# Patient Record
Sex: MALE | Race: BLACK OR AFRICAN AMERICAN | NOT HISPANIC OR LATINO
[De-identification: names, ages, dates, MRNs, and addresses within clinical notes are randomized per-mention and may not be internally consistent; named-entity substitution may affect disease eponyms.]

---

## 2022-01-24 ENCOUNTER — APPOINTMENT (OUTPATIENT)
Dept: FAMILY MEDICINE | Facility: CLINIC | Age: 22
End: 2022-01-24
Payer: COMMERCIAL

## 2022-01-24 VITALS
DIASTOLIC BLOOD PRESSURE: 80 MMHG | OXYGEN SATURATION: 98 % | SYSTOLIC BLOOD PRESSURE: 110 MMHG | WEIGHT: 214 LBS | TEMPERATURE: 97.7 F | HEIGHT: 69 IN | BODY MASS INDEX: 31.7 KG/M2 | HEART RATE: 72 BPM

## 2022-01-24 DIAGNOSIS — Z83.3 FAMILY HISTORY OF DIABETES MELLITUS: ICD-10-CM

## 2022-01-24 DIAGNOSIS — Z13.220 ENCOUNTER FOR SCREENING FOR LIPOID DISORDERS: ICD-10-CM

## 2022-01-24 DIAGNOSIS — Z13.29 ENCOUNTER FOR SCREENING FOR OTHER SUSPECTED ENDOCRINE DISORDER: ICD-10-CM

## 2022-01-24 DIAGNOSIS — Z13.1 ENCOUNTER FOR SCREENING FOR DIABETES MELLITUS: ICD-10-CM

## 2022-01-24 DIAGNOSIS — Z23 ENCOUNTER FOR IMMUNIZATION: ICD-10-CM

## 2022-01-24 DIAGNOSIS — Z13.21 ENCOUNTER FOR SCREENING FOR NUTRITIONAL DISORDER: ICD-10-CM

## 2022-01-24 DIAGNOSIS — Z00.00 ENCOUNTER FOR GENERAL ADULT MEDICAL EXAMINATION W/OUT ABNORMAL FINDINGS: ICD-10-CM

## 2022-01-24 PROCEDURE — 36415 COLL VENOUS BLD VENIPUNCTURE: CPT

## 2022-01-24 PROCEDURE — 99385 PREV VISIT NEW AGE 18-39: CPT | Mod: 25

## 2022-01-24 PROCEDURE — G0444 DEPRESSION SCREEN ANNUAL: CPT | Mod: 59

## 2022-01-24 PROCEDURE — G0442 ANNUAL ALCOHOL SCREEN 15 MIN: CPT

## 2022-01-24 NOTE — ASSESSMENT
[FreeTextEntry1] : Annual physical: f/u routine labwork\par BMI 31: Recommend low fat diet, wt loss, exercise, nutritional counseling provided, f/u lipid panel\par RTC 3 wks

## 2022-01-24 NOTE — HEALTH RISK ASSESSMENT
[Very Good] : ~his/her~  mood as very good [Never] : Never [1 or 2 (0 pts)] : 1 or 2 (0 points) [Never (0 pts)] : Never (0 points) [No] : In the past 12 months have you used drugs other than those required for medical reasons? No [No falls in past year] : Patient reported no falls in the past year [0] : 2) Feeling down, depressed, or hopeless: Not at all (0) [PHQ-2 Negative - No further assessment needed] : PHQ-2 Negative - No further assessment needed [Audit-CScore] : 0 [de-identified] : needs improvement [de-identified] : needs improvement [SJS3Othhn] : 0 [HIV test declined] : HIV test declined [Hepatitis C test declined] : Hepatitis C test declined [With Family] : lives with family [Employed] : employed [Student] : student [Single] : single [Sexually Active] : not sexually active [High Risk Behavior] : no high risk behavior [Feels Safe at Home] : Feels safe at home [Fully functional (bathing, dressing, toileting, transferring, walking, feeding)] : Fully functional (bathing, dressing, toileting, transferring, walking, feeding) [Fully functional (using the telephone, shopping, preparing meals, housekeeping, doing laundry, using] : Fully functional and needs no help or supervision to perform IADLs (using the telephone, shopping, preparing meals, housekeeping, doing laundry, using transportation, managing medications and managing finances) [Reports changes in hearing] : Reports no changes in hearing [Reports changes in vision] : Reports no changes in vision [Reports changes in dental health] : Reports no changes in dental health

## 2022-01-24 NOTE — HISTORY OF PRESENT ILLNESS
[FreeTextEntry1] : NP-CPE [de-identified] : 22 yo male presents for annual physical. No acute complaints. Reports feeling well. Denies fever, chills, cp, palpitations, sob, nv, heat/cold intolerance, dizziness, melena, hematochezia, muscle weakness, loss of sensation, bowel/bladder incontinence or calf pain.\par

## 2022-01-30 LAB
25(OH)D3 SERPL-MCNC: 10 NG/ML
ALBUMIN SERPL ELPH-MCNC: 4.6 G/DL
ALP BLD-CCNC: 69 U/L
ALT SERPL-CCNC: 29 U/L
ANION GAP SERPL CALC-SCNC: 12 MMOL/L
APPEARANCE: CLEAR
AST SERPL-CCNC: 23 U/L
BACTERIA: NEGATIVE
BASOPHILS # BLD AUTO: 0.01 K/UL
BASOPHILS NFR BLD AUTO: 0.2 %
BILIRUB SERPL-MCNC: 0.4 MG/DL
BILIRUBIN URINE: NEGATIVE
BLOOD URINE: NEGATIVE
BUN SERPL-MCNC: 8 MG/DL
CALCIUM SERPL-MCNC: 9.4 MG/DL
CHLORIDE SERPL-SCNC: 99 MMOL/L
CHOLEST SERPL-MCNC: 121 MG/DL
CO2 SERPL-SCNC: 26 MMOL/L
COLOR: YELLOW
CREAT SERPL-MCNC: 0.74 MG/DL
EOSINOPHIL # BLD AUTO: 0.08 K/UL
EOSINOPHIL NFR BLD AUTO: 1.8 %
ESTIMATED AVERAGE GLUCOSE: 120 MG/DL
GLUCOSE QUALITATIVE U: NEGATIVE
GLUCOSE SERPL-MCNC: 84 MG/DL
HBA1C MFR BLD HPLC: 5.8 %
HCT VFR BLD CALC: 43.7 %
HDLC SERPL-MCNC: 36 MG/DL
HGB BLD-MCNC: 13.8 G/DL
HYALINE CASTS: 0 /LPF
IMM GRANULOCYTES NFR BLD AUTO: 0.5 %
KETONES URINE: NEGATIVE
LDLC SERPL CALC-MCNC: 69 MG/DL
LEUKOCYTE ESTERASE URINE: NEGATIVE
LYMPHOCYTES # BLD AUTO: 1.8 K/UL
LYMPHOCYTES NFR BLD AUTO: 40.5 %
MAN DIFF?: NORMAL
MCHC RBC-ENTMCNC: 22.3 PG
MCHC RBC-ENTMCNC: 31.6 GM/DL
MCV RBC AUTO: 70.6 FL
MICROSCOPIC-UA: NORMAL
MONOCYTES # BLD AUTO: 0.62 K/UL
MONOCYTES NFR BLD AUTO: 14 %
NEUTROPHILS # BLD AUTO: 1.91 K/UL
NEUTROPHILS NFR BLD AUTO: 43 %
NITRITE URINE: NEGATIVE
NONHDLC SERPL-MCNC: 85 MG/DL
PH URINE: 7.5
PLATELET # BLD AUTO: 321 K/UL
POTASSIUM SERPL-SCNC: 4.3 MMOL/L
PROT SERPL-MCNC: 7.4 G/DL
PROTEIN URINE: NORMAL
RBC # BLD: 6.19 M/UL
RBC # FLD: 14.7 %
RED BLOOD CELLS URINE: 2 /HPF
SODIUM SERPL-SCNC: 136 MMOL/L
SPECIFIC GRAVITY URINE: 1.03
SQUAMOUS EPITHELIAL CELLS: 0 /HPF
TRIGL SERPL-MCNC: 78 MG/DL
TSH SERPL-ACNC: 2.81 UIU/ML
UROBILINOGEN URINE: NORMAL
WBC # FLD AUTO: 4.44 K/UL
WHITE BLOOD CELLS URINE: 1 /HPF

## 2022-02-07 ENCOUNTER — APPOINTMENT (OUTPATIENT)
Dept: FAMILY MEDICINE | Facility: CLINIC | Age: 22
End: 2022-02-07
Payer: COMMERCIAL

## 2022-02-07 VITALS
TEMPERATURE: 96.9 F | DIASTOLIC BLOOD PRESSURE: 78 MMHG | SYSTOLIC BLOOD PRESSURE: 130 MMHG | WEIGHT: 214 LBS | OXYGEN SATURATION: 98 % | HEIGHT: 66 IN | BODY MASS INDEX: 34.39 KG/M2 | HEART RATE: 78 BPM

## 2022-02-07 VITALS — SYSTOLIC BLOOD PRESSURE: 126 MMHG | DIASTOLIC BLOOD PRESSURE: 78 MMHG

## 2022-02-07 PROCEDURE — 90686 IIV4 VACC NO PRSV 0.5 ML IM: CPT

## 2022-02-07 PROCEDURE — 99214 OFFICE O/P EST MOD 30 MIN: CPT | Mod: 25

## 2022-02-07 PROCEDURE — G0008: CPT

## 2022-02-07 PROCEDURE — 36415 COLL VENOUS BLD VENIPUNCTURE: CPT

## 2022-02-07 NOTE — HISTORY OF PRESENT ILLNESS
[FreeTextEntry1] : Pt is here for a follow up.\par  [de-identified] : 22 yo male presents for follow up of labwork. No acute complaints. reports feeling well. Denies fever, chills, cp, palpitations, sob, nv, heat/cold intolerance, dizziness, melena, hematochezia, muscle weakness, loss of sensation, bowel/bladder incontinence or calf pain.\par

## 2022-02-07 NOTE — ASSESSMENT
[FreeTextEntry1] : Microcytosis: f/u repeat CBC/iron studies/b12/folate\par Low HDL: Recommend low fat diet, wt loss, exercise, nutritional counseling provided, repeat in 3 -  6months\par Prediabetes: recommend low carb diet, wt loss, exercise, repeat in 3 - 6 months\par Vitamin D def, severe, c/w D3 50k weekly x 12 wks, repeat in 3 months\par RTC 4 wks

## 2022-02-18 LAB
BASOPHILS # BLD AUTO: 0.02 K/UL
BASOPHILS NFR BLD AUTO: 0.5 %
EOSINOPHIL # BLD AUTO: 0.06 K/UL
EOSINOPHIL NFR BLD AUTO: 1.5 %
FERRITIN SERPL-MCNC: 120 NG/ML
FOLATE SERPL-MCNC: 13.8 NG/ML
HCT VFR BLD CALC: 46 %
HGB BLD-MCNC: 14.1 G/DL
IMM GRANULOCYTES NFR BLD AUTO: 0.2 %
IRON SATN MFR SERPL: 31 %
IRON SERPL-MCNC: 99 UG/DL
LYMPHOCYTES # BLD AUTO: 1.77 K/UL
LYMPHOCYTES NFR BLD AUTO: 43.2 %
MAN DIFF?: NORMAL
MCHC RBC-ENTMCNC: 22.2 PG
MCHC RBC-ENTMCNC: 30.7 GM/DL
MCV RBC AUTO: 72.3 FL
MONOCYTES # BLD AUTO: 0.37 K/UL
MONOCYTES NFR BLD AUTO: 9 %
NEUTROPHILS # BLD AUTO: 1.87 K/UL
NEUTROPHILS NFR BLD AUTO: 45.6 %
PLATELET # BLD AUTO: 365 K/UL
RBC # BLD: 6.36 M/UL
RBC # FLD: 16.4 %
TIBC SERPL-MCNC: 313 UG/DL
TRANSFERRIN SERPL-MCNC: 255 MG/DL
UIBC SERPL-MCNC: 214 UG/DL
VIT B12 SERPL-MCNC: 1262 PG/ML
WBC # FLD AUTO: 4.1 K/UL

## 2022-02-28 ENCOUNTER — APPOINTMENT (OUTPATIENT)
Dept: FAMILY MEDICINE | Facility: CLINIC | Age: 22
End: 2022-02-28
Payer: COMMERCIAL

## 2022-02-28 VITALS
HEIGHT: 66 IN | SYSTOLIC BLOOD PRESSURE: 112 MMHG | OXYGEN SATURATION: 97 % | BODY MASS INDEX: 34.07 KG/M2 | HEART RATE: 89 BPM | TEMPERATURE: 97.5 F | DIASTOLIC BLOOD PRESSURE: 74 MMHG | WEIGHT: 212 LBS

## 2022-02-28 DIAGNOSIS — R73.03 PREDIABETES.: ICD-10-CM

## 2022-02-28 DIAGNOSIS — E78.6 LIPOPROTEIN DEFICIENCY: ICD-10-CM

## 2022-02-28 DIAGNOSIS — E55.9 VITAMIN D DEFICIENCY, UNSPECIFIED: ICD-10-CM

## 2022-02-28 DIAGNOSIS — R71.8 OTHER ABNORMALITY OF RED BLOOD CELLS: ICD-10-CM

## 2022-02-28 PROCEDURE — 99214 OFFICE O/P EST MOD 30 MIN: CPT | Mod: 25

## 2022-02-28 PROCEDURE — 36415 COLL VENOUS BLD VENIPUNCTURE: CPT

## 2022-02-28 NOTE — ASSESSMENT
[FreeTextEntry1] : Microcytosis: no anemia, iron studies wnl, f/u hgb electrophoresis\par Prediabetes: recommend low carb diet, wt loss, exercise, repeat a1c in 2 months\par Low HDL: Recommend low fat diet, wt loss, exercise, nutritional counseling provided, repeat lipid panel in 2 months\par Vitamin D def: c/w D3 50k weekly x 3 months, repeat in 3 months\par RTC 2 wks

## 2022-02-28 NOTE — HISTORY OF PRESENT ILLNESS
[FreeTextEntry1] : Pt is here for blood work script. [de-identified] : 20 yo male presents for follow up. No acute complaints. Reports feeling well. Denies fever, chills, cp, palpitations, sob, nv, heat/cold intolerance, dizziness, melena, hematochezia, muscle weakness, loss of sensation, bowel/bladder incontinence or calf pain.\par

## 2022-03-16 DIAGNOSIS — D56.3 THALASSEMIA MINOR: ICD-10-CM

## 2022-03-16 LAB
HGB A MFR BLD: 97.5 %
HGB A2 MFR BLD: 2.5 %
HGB FRACT BLD-IMP: NORMAL

## 2022-06-01 RX ORDER — ERGOCALCIFEROL 1.25 MG/1
1.25 MG CAPSULE ORAL
Qty: 12 | Refills: 0 | Status: ACTIVE | COMMUNITY
Start: 2022-02-18 | End: 1900-01-01

## 2023-11-07 ENCOUNTER — OFFICE (OUTPATIENT)
Dept: URBAN - METROPOLITAN AREA CLINIC 111 | Facility: CLINIC | Age: 23
Setting detail: OPHTHALMOLOGY
End: 2023-11-07
Payer: COMMERCIAL

## 2023-11-07 DIAGNOSIS — H10.45: ICD-10-CM

## 2023-11-07 DIAGNOSIS — H40.003: ICD-10-CM

## 2023-11-07 DIAGNOSIS — H16.213: ICD-10-CM

## 2023-11-07 DIAGNOSIS — H52.13: ICD-10-CM

## 2023-11-07 DIAGNOSIS — H52.213: ICD-10-CM

## 2023-11-07 PROCEDURE — 92015 DETERMINE REFRACTIVE STATE: CPT | Performed by: OPHTHALMOLOGY

## 2023-11-07 PROCEDURE — 92014 COMPRE OPH EXAM EST PT 1/>: CPT | Performed by: OPHTHALMOLOGY

## 2023-11-07 ASSESSMENT — REFRACTION_MANIFEST
OS_SPHERE: -3.00
OD_VA1: 20/30
OD_AXIS: 010
OS_AXIS: 170
OD_CYLINDER: -6.75
OD_SPHERE: -3.75
OS_VA1: 20/20-1
OS_CYLINDER: -5.00

## 2023-11-07 ASSESSMENT — REFRACTION_CURRENTRX
OD_CYLINDER: -7.75
OD_AXIS: 179
OD_VPRISM_DIRECTION: SV
OS_VPRISM_DIRECTION: SV
OS_SPHERE: -1.75
OD_AXIS: 04
OD_OVR_VA: 20/
OS_OVR_VA: 20/
OS_AXIS: 154
OD_SPHERE: -2.75
OD_SPHERE: -2.75
OD_OVR_VA: 20/
OS_CYLINDER: -5.00
OD_CYLINDER: -7.00
OS_SPHERE: ERR
OS_SPHERE: -2.50
OS_SPHERE: -2.75
OD_OVR_VA: 20/
OD_SPHERE: -1.75
OD_CYLINDER: -6.00
OS_CYLINDER: -3.50
OD_AXIS: 156
OD_AXIS: 006
OD_CYLINDER: -6.75
OD_VPRISM_DIRECTION: SV
OS_OVR_VA: 20/
OS_VPRISM_DIRECTION: SV
OS_AXIS: 159
OD_SPHERE: -2.50
OS_AXIS: 159
OS_OVR_VA: 20/
OS_CYLINDER: -2.50

## 2023-11-07 ASSESSMENT — CONFRONTATIONAL VISUAL FIELD TEST (CVF)
OS_COMMENTS: UTP
OD_COMMENTS: UTP

## 2023-11-07 ASSESSMENT — REFRACTION_AUTOREFRACTION
OS_AXIS: 164
OD_CYLINDER: -6.75
OS_CYLINDER: -5.00
OD_AXIS: 008
OD_SPHERE: -3.75
OS_SPHERE: -3.25

## 2023-11-07 ASSESSMENT — SPHEQUIV_DERIVED
OD_SPHEQUIV: -7.125
OD_SPHEQUIV: -7.125
OS_SPHEQUIV: -5.75
OS_SPHEQUIV: -5.5